# Patient Record
Sex: FEMALE | URBAN - METROPOLITAN AREA
[De-identification: names, ages, dates, MRNs, and addresses within clinical notes are randomized per-mention and may not be internally consistent; named-entity substitution may affect disease eponyms.]

---

## 2022-09-06 ENCOUNTER — OFFICE VISIT (OUTPATIENT)
Dept: URBAN - METROPOLITAN AREA CLINIC 26 | Facility: CLINIC | Age: 22
End: 2022-09-06
Payer: COMMERCIAL

## 2022-09-06 PROCEDURE — 92004 COMPRE OPH EXAM NEW PT 1/>: CPT | Performed by: OPTOMETRIST

## 2022-09-06 RX ORDER — LOTEPREDNOL ETABONATE AND TOBRAMYCIN 5; 3 MG/ML; MG/ML
SUSPENSION/ DROPS OPHTHALMIC
Qty: 5 | Refills: 0 | Status: ACTIVE
Start: 2022-09-06

## 2022-09-06 ASSESSMENT — KERATOMETRY: OD: 46.13

## 2022-09-06 ASSESSMENT — INTRAOCULAR PRESSURE
OS: 14
OD: 14

## 2022-09-06 NOTE — IMPRESSION/PLAN
Impression: Corneal edema secondary to contact lens, right eye: H18.211. Plan: possible ulcer forming but very little epi defect. RX zylet 1 gt QID OD.  discontinue CL wear for now. call if symptoms worsen.  
F/U 3 days

## 2022-09-09 ENCOUNTER — OFFICE VISIT (OUTPATIENT)
Dept: URBAN - METROPOLITAN AREA CLINIC 26 | Facility: CLINIC | Age: 22
End: 2022-09-09
Payer: COMMERCIAL

## 2022-09-09 DIAGNOSIS — H18.211 CORNEAL EDEMA SECONDARY TO CONTACT LENS, RIGHT EYE: Primary | ICD-10-CM

## 2022-09-09 PROCEDURE — 99213 OFFICE O/P EST LOW 20 MIN: CPT | Performed by: OPTOMETRIST

## 2022-09-09 ASSESSMENT — INTRAOCULAR PRESSURE
OS: 18
OD: 20

## 2022-09-09 NOTE — IMPRESSION/PLAN
Impression: Corneal edema secondary to contact lens, right eye: H18.211. Plan: improving. does not appear infectious. pinpoint epi defect still present. continue Zylet qid OD. no contact lens wear x 1 week. rtc 1 week f/u with Dr. Guevara [place] or prn.